# Patient Record
Sex: FEMALE | Race: WHITE | Employment: FULL TIME | URBAN - METROPOLITAN AREA
[De-identification: names, ages, dates, MRNs, and addresses within clinical notes are randomized per-mention and may not be internally consistent; named-entity substitution may affect disease eponyms.]

---

## 2019-03-15 ENCOUNTER — HOSPITAL ENCOUNTER (OUTPATIENT)
Dept: MAMMOGRAPHY | Age: 59
Discharge: HOME | End: 2019-03-17
Payer: COMMERCIAL

## 2019-03-15 DIAGNOSIS — Z12.31 VISIT FOR SCREENING MAMMOGRAM: ICD-10-CM

## 2019-03-15 PROCEDURE — 77063 BREAST TOMOSYNTHESIS BI: CPT

## 2019-03-29 ENCOUNTER — TELEPHONE (OUTPATIENT)
Dept: ONCOLOGY | Age: 59
End: 2019-03-29

## 2020-06-12 ENCOUNTER — HOSPITAL ENCOUNTER (OUTPATIENT)
Dept: MAMMOGRAPHY | Age: 60
Discharge: HOME | End: 2020-06-14
Payer: COMMERCIAL

## 2020-06-12 PROCEDURE — 77063 BREAST TOMOSYNTHESIS BI: CPT

## 2024-01-11 ENCOUNTER — APPOINTMENT (OUTPATIENT)
Dept: OBSTETRICS AND GYNECOLOGY | Facility: CLINIC | Age: 64
End: 2024-01-11
Payer: COMMERCIAL

## 2024-01-12 ENCOUNTER — OFFICE VISIT (OUTPATIENT)
Dept: OBSTETRICS AND GYNECOLOGY | Facility: CLINIC | Age: 64
End: 2024-01-12
Payer: COMMERCIAL

## 2024-01-12 VITALS
DIASTOLIC BLOOD PRESSURE: 80 MMHG | SYSTOLIC BLOOD PRESSURE: 130 MMHG | HEIGHT: 62 IN | BODY MASS INDEX: 30.91 KG/M2 | WEIGHT: 168 LBS

## 2024-01-12 DIAGNOSIS — N84.1 POLYP OF CERVIX: ICD-10-CM

## 2024-01-12 DIAGNOSIS — Z01.419 WELL WOMAN EXAM: Primary | ICD-10-CM

## 2024-01-12 DIAGNOSIS — Z12.31 ENCOUNTER FOR SCREENING MAMMOGRAM FOR BREAST CANCER: ICD-10-CM

## 2024-01-12 PROBLEM — R10.2 PELVIC PRESSURE IN FEMALE: Status: ACTIVE | Noted: 2024-01-12

## 2024-01-12 PROBLEM — M79.669 CALF PAIN: Status: ACTIVE | Noted: 2024-01-12

## 2024-01-12 PROBLEM — R00.2 HEART PALPITATIONS: Status: ACTIVE | Noted: 2024-01-12

## 2024-01-12 PROBLEM — E55.9 VITAMIN D DEFICIENCY: Status: ACTIVE | Noted: 2024-01-12

## 2024-01-12 PROBLEM — M85.80 OSTEOPENIA: Status: ACTIVE | Noted: 2024-01-12

## 2024-01-12 PROBLEM — N83.8 OVARIAN MASS: Status: ACTIVE | Noted: 2024-01-12

## 2024-01-12 PROBLEM — E03.9 HYPOTHYROIDISM: Status: ACTIVE | Noted: 2024-01-12

## 2024-01-12 PROBLEM — L03.90 CELLULITIS: Status: ACTIVE | Noted: 2024-01-12
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
## 2024-01-12 PROBLEM — M25.571 RIGHT ANKLE PAIN: Status: ACTIVE | Noted: 2024-01-12

## 2024-01-12 PROBLEM — L25.9 CONTACT DERMATITIS: Status: ACTIVE | Noted: 2024-01-12

## 2024-01-12 PROBLEM — N28.1 KIDNEY CYSTS: Status: ACTIVE | Noted: 2024-01-12

## 2024-01-12 PROBLEM — J01.90 ACUTE SINUSITIS: Status: ACTIVE | Noted: 2024-01-12

## 2024-01-12 PROBLEM — M15.9 GENERALIZED OSTEOARTHRITIS OF MULTIPLE SITES: Status: ACTIVE | Noted: 2024-01-12

## 2024-01-12 PROCEDURE — 88175 CYTOPATH C/V AUTO FLUID REDO: CPT

## 2024-01-12 PROCEDURE — 99396 PREV VISIT EST AGE 40-64: CPT | Performed by: OBSTETRICS & GYNECOLOGY

## 2024-01-12 RX ORDER — LEVOTHYROXINE SODIUM 112 UG/1
TABLET ORAL
COMMUNITY
Start: 2015-05-22 | End: 2024-03-25 | Stop reason: SDUPTHER

## 2024-01-12 RX ORDER — VIT C/E/ZN/COPPR/LUTEIN/ZEAXAN 250MG-90MG
CAPSULE ORAL
COMMUNITY

## 2024-01-12 ASSESSMENT — PAIN SCALES - GENERAL: PAINLEVEL: 0-NO PAIN

## 2024-01-12 NOTE — PROGRESS NOTES
Subjective   Patient ID: Whitley Zmaora is a 63 y.o. female who presents for Well Women Visit (PT is here for Annual Pap and Mammogram order.  /HPV done 1/5/2023 Neg-).  HPI  No voiced complaints  Review of Systems  10 symptoms have been reviewed and are negative and noncontributory to the patient current ailments.      Objective   Physical Exam  Vital signs reviewed    CONSTITUTIONAL- well nourished, well developed, looks like stated age.  She is sitting comfortably on the exam table in no apparent distress  SKIN- normal skin color and pigmentation no visible lesions  EYES- normal external exam  THYROID- symmetrical ,normal size and normal consistency  HEART- RRR without murmur, S3 or S4.  LUNGS- breathing comfortably, no dyspnea  EXTREMITIES- no deformities  NEUROLOGICAL- oriented and no focal signs.  Cranial nerves II through XII intact  PSYCHIATRIC- alert, pleasant and cordial, age-appropriate, not anxious, or depressed appearing  BREASTS-normal appearance, no skin changes or nipple discharge.  Palpation of the breast and axillae: No palpable mass and no axillary lymphadenopathy  ABDOMEN- soft, non distended, bowel sound normal pitch and intensity,no palpable abnormal masses  GENITOURINARY- External genitalia: Normal.                                 - Uterus: AV/AF NSSC, mobile and nontender                                -The adnexal areas are free of tenderness or mass                                -There noted a solitary cervical polyp.                              -Vagina without lesions, mucosa pink and well-hydrated, discharge is physiologic.                                   Inspection of Perianal Area: Normal    Assessment/Plan   1.  Well woman exam  2.  Encounter for screening mammogram for breast cancer  3.  Cervical polyp-asymptomatic  -We discussed indications for removal of cervical polyp.  Patient requests removal we will schedule at her convenience           Gustabo Charles DO 01/12/24 10:10  AM

## 2024-01-18 ENCOUNTER — ANCILLARY PROCEDURE (OUTPATIENT)
Dept: RADIOLOGY | Facility: CLINIC | Age: 64
End: 2024-01-18
Payer: COMMERCIAL

## 2024-01-18 VITALS — BODY MASS INDEX: 30.91 KG/M2 | WEIGHT: 167.99 LBS | HEIGHT: 62 IN

## 2024-01-18 DIAGNOSIS — Z12.31 ENCOUNTER FOR SCREENING MAMMOGRAM FOR BREAST CANCER: ICD-10-CM

## 2024-01-18 DIAGNOSIS — Z01.419 WELL WOMAN EXAM: ICD-10-CM

## 2024-01-18 PROCEDURE — 77063 BREAST TOMOSYNTHESIS BI: CPT | Performed by: STUDENT IN AN ORGANIZED HEALTH CARE EDUCATION/TRAINING PROGRAM

## 2024-01-18 PROCEDURE — 77067 SCR MAMMO BI INCL CAD: CPT | Performed by: STUDENT IN AN ORGANIZED HEALTH CARE EDUCATION/TRAINING PROGRAM

## 2024-01-18 PROCEDURE — 77067 SCR MAMMO BI INCL CAD: CPT

## 2024-01-23 PROBLEM — H81.10 BENIGN PAROXYSMAL POSITIONAL VERTIGO: Status: ACTIVE | Noted: 2024-01-23

## 2024-01-23 PROBLEM — J30.9 ALLERGIC RHINITIS: Status: RESOLVED | Noted: 2024-01-23 | Resolved: 2024-01-23

## 2024-01-23 PROBLEM — M54.50 LOW BACK PAIN: Status: RESOLVED | Noted: 2024-01-23 | Resolved: 2024-01-23

## 2024-01-23 PROBLEM — M25.569 KNEE PAIN: Status: ACTIVE | Noted: 2024-01-23

## 2024-01-24 ENCOUNTER — OFFICE VISIT (OUTPATIENT)
Dept: OBSTETRICS AND GYNECOLOGY | Facility: CLINIC | Age: 64
End: 2024-01-24
Payer: COMMERCIAL

## 2024-01-24 VITALS
SYSTOLIC BLOOD PRESSURE: 132 MMHG | DIASTOLIC BLOOD PRESSURE: 86 MMHG | WEIGHT: 171 LBS | BODY MASS INDEX: 31.47 KG/M2 | HEIGHT: 62 IN

## 2024-01-24 DIAGNOSIS — N84.1 CERVICAL POLYP: ICD-10-CM

## 2024-01-24 PROCEDURE — 88305 TISSUE EXAM BY PATHOLOGIST: CPT

## 2024-01-24 PROCEDURE — 88305 TISSUE EXAM BY PATHOLOGIST: CPT | Performed by: PATHOLOGY

## 2024-01-24 PROCEDURE — 57500 BIOPSY OF CERVIX: CPT | Performed by: OBSTETRICS & GYNECOLOGY

## 2024-01-24 RX ORDER — SODIUM, POTASSIUM,MAG SULFATES 17.5-3.13G
SOLUTION, RECONSTITUTED, ORAL ORAL
COMMUNITY
Start: 2020-02-05

## 2024-01-24 NOTE — PROGRESS NOTES
Patient ID: Whitley Zamora is a 63 y.o. female.    Procedures  -Patient presents for removal of a asymptomatic cervical polyp.  All risks and complications were discussed verbal was obtained.    The cervix was then prepped with Betadine the polyp was then infiltrated with 1% lidocaine.  The polyp was then removed by sharp dissection.  Hemostasis was obtained with the application of Monsel solution.  Blood loss less than 50 cc no complications were encountered.    Patient left the office in clinically sound condition

## 2024-01-26 LAB
CYTOLOGY CMNT CVX/VAG CYTO-IMP: NORMAL
LAB AP HPV GENOTYPE QUESTION: YES
LAB AP HPV HR: NORMAL
LABORATORY COMMENT REPORT: NORMAL
PATH REPORT.TOTAL CANCER: NORMAL

## 2024-01-29 LAB
LABORATORY COMMENT REPORT: NORMAL
PATH REPORT.FINAL DX SPEC: NORMAL
PATH REPORT.GROSS SPEC: NORMAL
PATH REPORT.RELEVANT HX SPEC: NORMAL
PATH REPORT.TOTAL CANCER: NORMAL

## 2024-02-13 RX ORDER — LEVOTHYROXINE SODIUM 100 UG/1
100 TABLET ORAL DAILY
Qty: 90 TABLET | Refills: 3 | OUTPATIENT
Start: 2024-02-13

## 2024-03-06 DIAGNOSIS — Z01.419 WELL WOMAN EXAM: ICD-10-CM

## 2024-03-06 DIAGNOSIS — E55.9 VITAMIN D DEFICIENCY: ICD-10-CM

## 2024-03-06 DIAGNOSIS — E03.9 HYPOTHYROIDISM, UNSPECIFIED TYPE: ICD-10-CM

## 2024-03-13 ENCOUNTER — LAB (OUTPATIENT)
Dept: LAB | Facility: LAB | Age: 64
End: 2024-03-13
Payer: COMMERCIAL

## 2024-03-13 DIAGNOSIS — Z01.419 WELL WOMAN EXAM: ICD-10-CM

## 2024-03-13 DIAGNOSIS — E03.9 HYPOTHYROIDISM, UNSPECIFIED TYPE: ICD-10-CM

## 2024-03-13 DIAGNOSIS — E55.9 VITAMIN D DEFICIENCY: ICD-10-CM

## 2024-03-13 LAB
25(OH)D3 SERPL-MCNC: 45 NG/ML (ref 30–100)
ALBUMIN SERPL BCP-MCNC: 3.9 G/DL (ref 3.4–5)
ALP SERPL-CCNC: 41 U/L (ref 33–136)
ALT SERPL W P-5'-P-CCNC: 10 U/L (ref 7–45)
ANION GAP SERPL CALC-SCNC: 12 MMOL/L (ref 10–20)
AST SERPL W P-5'-P-CCNC: 15 U/L (ref 9–39)
BASOPHILS # BLD AUTO: 0.04 X10*3/UL (ref 0–0.1)
BASOPHILS NFR BLD AUTO: 0.7 %
BILIRUB SERPL-MCNC: 0.4 MG/DL (ref 0–1.2)
BUN SERPL-MCNC: 11 MG/DL (ref 6–23)
CALCIUM SERPL-MCNC: 8.8 MG/DL (ref 8.6–10.6)
CHLORIDE SERPL-SCNC: 105 MMOL/L (ref 98–107)
CHOLEST SERPL-MCNC: 176 MG/DL (ref 0–199)
CHOLESTEROL/HDL RATIO: 3
CO2 SERPL-SCNC: 28 MMOL/L (ref 21–32)
CREAT SERPL-MCNC: 0.73 MG/DL (ref 0.5–1.05)
EGFRCR SERPLBLD CKD-EPI 2021: >90 ML/MIN/1.73M*2
EOSINOPHIL # BLD AUTO: 0.17 X10*3/UL (ref 0–0.7)
EOSINOPHIL NFR BLD AUTO: 3.1 %
ERYTHROCYTE [DISTWIDTH] IN BLOOD BY AUTOMATED COUNT: 13.6 % (ref 11.5–14.5)
GLUCOSE SERPL-MCNC: 76 MG/DL (ref 74–99)
HCT VFR BLD AUTO: 35.5 % (ref 36–46)
HDLC SERPL-MCNC: 59.4 MG/DL
HGB BLD-MCNC: 11.8 G/DL (ref 12–16)
IMM GRANULOCYTES # BLD AUTO: 0.01 X10*3/UL (ref 0–0.7)
IMM GRANULOCYTES NFR BLD AUTO: 0.2 % (ref 0–0.9)
LDLC SERPL CALC-MCNC: 103 MG/DL
LYMPHOCYTES # BLD AUTO: 2.08 X10*3/UL (ref 1.2–4.8)
LYMPHOCYTES NFR BLD AUTO: 37.7 %
MCH RBC QN AUTO: 30.7 PG (ref 26–34)
MCHC RBC AUTO-ENTMCNC: 33.2 G/DL (ref 32–36)
MCV RBC AUTO: 92 FL (ref 80–100)
MONOCYTES # BLD AUTO: 0.43 X10*3/UL (ref 0.1–1)
MONOCYTES NFR BLD AUTO: 7.8 %
NEUTROPHILS # BLD AUTO: 2.78 X10*3/UL (ref 1.2–7.7)
NEUTROPHILS NFR BLD AUTO: 50.5 %
NON HDL CHOLESTEROL: 117 MG/DL (ref 0–149)
NRBC BLD-RTO: 0 /100 WBCS (ref 0–0)
PLATELET # BLD AUTO: 267 X10*3/UL (ref 150–450)
POTASSIUM SERPL-SCNC: 4.1 MMOL/L (ref 3.5–5.3)
PROT SERPL-MCNC: 6.2 G/DL (ref 6.4–8.2)
RBC # BLD AUTO: 3.84 X10*6/UL (ref 4–5.2)
SODIUM SERPL-SCNC: 141 MMOL/L (ref 136–145)
T4 FREE SERPL-MCNC: 1.64 NG/DL (ref 0.78–1.48)
TRIGL SERPL-MCNC: 66 MG/DL (ref 0–149)
TSH SERPL-ACNC: 5.32 MIU/L (ref 0.44–3.98)
VLDL: 13 MG/DL (ref 0–40)
WBC # BLD AUTO: 5.5 X10*3/UL (ref 4.4–11.3)

## 2024-03-13 PROCEDURE — 85025 COMPLETE CBC W/AUTO DIFF WBC: CPT

## 2024-03-13 PROCEDURE — 84443 ASSAY THYROID STIM HORMONE: CPT

## 2024-03-13 PROCEDURE — 82306 VITAMIN D 25 HYDROXY: CPT

## 2024-03-13 PROCEDURE — 80053 COMPREHEN METABOLIC PANEL: CPT

## 2024-03-13 PROCEDURE — 36415 COLL VENOUS BLD VENIPUNCTURE: CPT

## 2024-03-13 PROCEDURE — 80061 LIPID PANEL: CPT

## 2024-03-13 PROCEDURE — 84439 ASSAY OF FREE THYROXINE: CPT

## 2024-03-25 ENCOUNTER — OFFICE VISIT (OUTPATIENT)
Dept: PRIMARY CARE | Facility: CLINIC | Age: 64
End: 2024-03-25
Payer: COMMERCIAL

## 2024-03-25 VITALS
OXYGEN SATURATION: 94 % | HEIGHT: 62 IN | BODY MASS INDEX: 30.91 KG/M2 | WEIGHT: 168 LBS | HEART RATE: 72 BPM | DIASTOLIC BLOOD PRESSURE: 82 MMHG | RESPIRATION RATE: 16 BRPM | SYSTOLIC BLOOD PRESSURE: 132 MMHG | TEMPERATURE: 96.8 F

## 2024-03-25 DIAGNOSIS — Z00.00 PHYSICAL EXAM: Primary | ICD-10-CM

## 2024-03-25 DIAGNOSIS — Z78.0 POSTMENOPAUSAL: ICD-10-CM

## 2024-03-25 DIAGNOSIS — E55.9 VITAMIN D DEFICIENCY: ICD-10-CM

## 2024-03-25 DIAGNOSIS — E03.9 HYPOTHYROIDISM, UNSPECIFIED TYPE: ICD-10-CM

## 2024-03-25 DIAGNOSIS — E78.5 MILD HYPERLIPIDEMIA: ICD-10-CM

## 2024-03-25 DIAGNOSIS — Z12.11 SCREENING FOR COLON CANCER: ICD-10-CM

## 2024-03-25 DIAGNOSIS — D50.9 IRON DEFICIENCY ANEMIA, UNSPECIFIED IRON DEFICIENCY ANEMIA TYPE: ICD-10-CM

## 2024-03-25 PROCEDURE — 1036F TOBACCO NON-USER: CPT | Performed by: FAMILY MEDICINE

## 2024-03-25 PROCEDURE — 99214 OFFICE O/P EST MOD 30 MIN: CPT | Performed by: FAMILY MEDICINE

## 2024-03-25 PROCEDURE — 99396 PREV VISIT EST AGE 40-64: CPT | Performed by: FAMILY MEDICINE

## 2024-03-25 RX ORDER — LEVOTHYROXINE SODIUM 112 UG/1
TABLET ORAL
Qty: 30 TABLET | Refills: 1 | Status: SHIPPED | OUTPATIENT
Start: 2024-03-25 | End: 2024-05-10 | Stop reason: SDUPTHER

## 2024-03-25 ASSESSMENT — ANXIETY QUESTIONNAIRES
7. FEELING AFRAID AS IF SOMETHING AWFUL MIGHT HAPPEN: NOT AT ALL
1. FEELING NERVOUS, ANXIOUS, OR ON EDGE: NOT AT ALL
5. BEING SO RESTLESS THAT IT IS HARD TO SIT STILL: NOT AT ALL
4. TROUBLE RELAXING: NOT AT ALL
GAD7 TOTAL SCORE: 0
IF YOU CHECKED OFF ANY PROBLEMS ON THIS QUESTIONNAIRE, HOW DIFFICULT HAVE THESE PROBLEMS MADE IT FOR YOU TO DO YOUR WORK, TAKE CARE OF THINGS AT HOME, OR GET ALONG WITH OTHER PEOPLE: NOT DIFFICULT AT ALL
3. WORRYING TOO MUCH ABOUT DIFFERENT THINGS: NOT AT ALL
6. BECOMING EASILY ANNOYED OR IRRITABLE: NOT AT ALL
2. NOT BEING ABLE TO STOP OR CONTROL WORRYING: NOT AT ALL

## 2024-03-25 ASSESSMENT — PATIENT HEALTH QUESTIONNAIRE - PHQ9
SUM OF ALL RESPONSES TO PHQ9 QUESTIONS 1 AND 2: 0
4. FEELING TIRED OR HAVING LITTLE ENERGY: SEVERAL DAYS
5. POOR APPETITE OR OVEREATING: NOT AT ALL
2. FEELING DOWN, DEPRESSED OR HOPELESS: NOT AT ALL
7. TROUBLE CONCENTRATING ON THINGS, SUCH AS READING THE NEWSPAPER OR WATCHING TELEVISION: NOT AT ALL
9. THOUGHTS THAT YOU WOULD BE BETTER OFF DEAD, OR OF HURTING YOURSELF: NOT AT ALL
8. MOVING OR SPEAKING SO SLOWLY THAT OTHER PEOPLE COULD HAVE NOTICED. OR THE OPPOSITE, BEING SO FIGETY OR RESTLESS THAT YOU HAVE BEEN MOVING AROUND A LOT MORE THAN USUAL: NOT AT ALL
6. FEELING BAD ABOUT YOURSELF - OR THAT YOU ARE A FAILURE OR HAVE LET YOURSELF OR YOUR FAMILY DOWN: NOT AT ALL
1. LITTLE INTEREST OR PLEASURE IN DOING THINGS: NOT AT ALL
3. TROUBLE FALLING OR STAYING ASLEEP OR SLEEPING TOO MUCH: NOT AT ALL
SUM OF ALL RESPONSES TO PHQ QUESTIONS 1-9: 1

## 2024-03-25 ASSESSMENT — PROMIS GLOBAL HEALTH SCALE
RATE_AVERAGE_FATIGUE: MILD
EMOTIONAL_PROBLEMS: RARELY
RATE_MENTAL_HEALTH: VERY GOOD
RATE_AVERAGE_PAIN: 1
CARRYOUT_SOCIAL_ACTIVITIES: VERY GOOD
CARRYOUT_PHYSICAL_ACTIVITIES: COMPLETELY
RATE_QUALITY_OF_LIFE: VERY GOOD
RATE_SOCIAL_SATISFACTION: VERY GOOD
RATE_PHYSICAL_HEALTH: VERY GOOD
RATE_GENERAL_HEALTH: VERY GOOD

## 2024-03-25 ASSESSMENT — PAIN SCALES - GENERAL: PAINLEVEL: 0-NO PAIN

## 2024-03-25 NOTE — PROGRESS NOTES
"Subjective   Patient ID: Whitley Zamora is a 63 y.o. female who presents for cpe.    HPI   The patient presents to the clinic for an annual physical exam. She has past medical history of hypothyroidism, heart palpitations, BPPV, vitamin D deficiency, ovarian mass, kidney cysts, generalized osteoarthritis of multiple sites, and contact dermatitis.    The patient had labs done on 03/13/2024 and requests to have them reviewed. Notably, the patient states that she donated blood on 03/03/2024 and she wonders if this may have affected her results.  She has mild anemia    The patient reports a discrepancy between her current dose of levothyroxine medication and the dose of levothyroxine medication listed on her  MyChart. She states that her  MyChart shows that she is taking levothyroxine 112 mcg daily. However, the patient states that she has been taking levothyroxine 100 mcg for treatment of hypothyroidism (as filled by her local pharmacy). She denies any side-effects to her levothyroxine medication. Her tsh is elevated and needs increased dose from 100 mcg and will have her take 112 mcg and then recheck tsh in 6-8 weeks    The patient states that she does not eat much meat. She does eat legumes and dairy and eggs for protein source often.    She continues to visit her OB-GYN (Dr. Charles) regularly. She is up-to-date on gynecological exam (01/12/2024) and mammogram screening (01/18/2024). She has not had a DEXA bone density screening for several years.     Taking D3 supplement    Her most recent colonoscopy screening was done on 03/04/2020 (3-year recall) due to Ta's.  Will order    She reports family history of arthritis and osteopenia with her mother.  Will get bone density    Review of Systems    Objective   /82   Pulse 72   Temp 36 °C (96.8 °F)   Resp 16   Ht 1.575 m (5' 2\")   Wt 76.2 kg (168 lb)   SpO2 94%   BMI 30.73 kg/m²     Physical Exam  Constitutional:       Appearance: Normal appearance. "   HENT:      Head: Normocephalic.      Right Ear: Tympanic membrane normal.      Left Ear: Tympanic membrane normal.      Mouth/Throat:      Pharynx: Oropharynx is clear.   Eyes:      Extraocular Movements: Extraocular movements intact.      Pupils: Pupils are equal, round, and reactive to light.   Neck:      Vascular: No carotid bruit.      Comments: No thyromegaly  Cardiovascular:      Rate and Rhythm: Normal rate and regular rhythm.      Pulses: Normal pulses.      Heart sounds: Normal heart sounds.   Pulmonary:      Effort: Pulmonary effort is normal.      Breath sounds: Normal breath sounds.   Abdominal:      General: Bowel sounds are normal.      Palpations: Abdomen is soft. There is no mass.      Tenderness: There is no abdominal tenderness.   Musculoskeletal:         General: Normal range of motion.      Cervical back: Normal range of motion.      Right lower leg: No edema.      Left lower leg: No edema.   Skin:     General: Skin is warm and dry.   Neurological:      General: No focal deficit present.      Mental Status: She is alert and oriented to person, place, and time.   Psychiatric:         Mood and Affect: Mood normal.         Behavior: Behavior normal.         Thought Content: Thought content normal.         Judgment: Judgment normal.         Assessment/Plan   Problem List Items Addressed This Visit             ICD-10-CM    Vitamin D deficiency E55.9    Hypothyroidism E03.9    Relevant Medications    levothyroxine (Synthroid, Levoxyl) 112 mcg tablet    Other Relevant Orders    TSH with reflex to Free T4 if abnormal    Mild hyperlipidemia E78.5     Other Visit Diagnoses         Codes    Physical exam    -  Primary Z00.00    Iron deficiency anemia, unspecified iron deficiency anemia type     D50.9    Relevant Orders    CBC and Auto Differential    Screening for colon cancer     Z12.11    Relevant Orders    Colonoscopy Screening; High Risk Patient    Postmenopausal     Z78.0    Relevant Orders    XR  DEXA bone density               The patient's labs (03/13/2024) were reviewed. Cholesterol results were mostly WNL (total cholesterol 176/HDL 59.4/triglycerides 66), but LDL (103) was slightly elevated. She was advised to cut down fats in the diet and start aerobic exercise. Chemistries were WNL with FBS of 76. Kidney function markers and liver enzymes were mostly WNL. Total protein (6.2) was slightly below normal range. Her TSH (5.32) was slightly elevated. Her vitamin D (45) was sufficient. Her blood count was mostly WNL, but demonstrated some mild anemia (RBC 3.84/Hemoglobin 11.8/Hematocrit 35.5). Her blood count results may have been slightly abnormal as she recently donated blood prior to having her labs drawn. Repeat labs (CBC, TSH) in ~6 weeks.    In regards to concerns with elevated TSH and confusion regarding the dose of her levothyroxine prescription, the patient was informed that she should be taking levothyroxine 112 mcg daily as previously directed. She received a refill for her levothyroxine prescription at the correct dosage (112 mcg daily). She will be evaluated  further following her repeat TSH screening in ~6 weeks. She was advised to continue monitoring symptoms for improvement/exacerbation.    A colonoscopy screening and DEXA bone density scan were ordered for the patient. The clinic will contact the patient upon receiving her screening results.    Prospective immunizations were discussed with the patient. She will consider receiving the Shingrix vaccine in the near future.  She will call to have done in the next several months    A comprehensive physical exam was conducted on the patient.     Reviewed correct way for taking thyroid medication    Discussed diet and exercise to help lipids in detail    6 month follow up otherwise    Scribe Attestation  By signing my name below, I, Kandace Amador , Scribe   attest that this documentation has been prepared under the direction and in the presence of  Whitley Person DO.

## 2024-04-02 ENCOUNTER — HOSPITAL ENCOUNTER (OUTPATIENT)
Dept: RADIOLOGY | Facility: CLINIC | Age: 64
Discharge: HOME | End: 2024-04-02
Payer: COMMERCIAL

## 2024-04-02 DIAGNOSIS — Z78.0 POSTMENOPAUSAL: ICD-10-CM

## 2024-04-02 PROCEDURE — 77080 DXA BONE DENSITY AXIAL: CPT

## 2024-04-02 PROCEDURE — 77080 DXA BONE DENSITY AXIAL: CPT | Performed by: RADIOLOGY

## 2024-04-09 ENCOUNTER — TELEPHONE (OUTPATIENT)
Dept: PRIMARY CARE | Facility: CLINIC | Age: 64
End: 2024-04-09
Payer: COMMERCIAL

## 2024-04-09 NOTE — TELEPHONE ENCOUNTER
----- Message from Whitley Person DO sent at 4/7/2024  1:45 PM EDT -----  Report to patient that her bone density study shows that her spine has normal bone density.  Her femur and hip both show osteopenia.  She should make sure she is taking vitamin D3, eating calcium rich diet and calcium supplement.  Also weightbearing exercise is important.  Repeat bone density 2 years

## 2024-04-30 ENCOUNTER — TELEPHONE (OUTPATIENT)
Dept: PRIMARY CARE | Facility: CLINIC | Age: 64
End: 2024-04-30
Payer: COMMERCIAL

## 2024-04-30 NOTE — TELEPHONE ENCOUNTER
Patient insurance company called states some of her blood work the cpt 800.61 was denied and needs to be recoded can you take a look at her 3.13.24 visit and let me know

## 2024-05-08 ENCOUNTER — LAB (OUTPATIENT)
Dept: LAB | Facility: LAB | Age: 64
End: 2024-05-08
Payer: COMMERCIAL

## 2024-05-08 DIAGNOSIS — D50.9 IRON DEFICIENCY ANEMIA, UNSPECIFIED IRON DEFICIENCY ANEMIA TYPE: ICD-10-CM

## 2024-05-08 DIAGNOSIS — E03.9 HYPOTHYROIDISM, UNSPECIFIED TYPE: ICD-10-CM

## 2024-05-08 LAB
BASOPHILS # BLD AUTO: 0.05 X10*3/UL (ref 0–0.1)
BASOPHILS NFR BLD AUTO: 0.9 %
EOSINOPHIL # BLD AUTO: 0.18 X10*3/UL (ref 0–0.7)
EOSINOPHIL NFR BLD AUTO: 3.4 %
ERYTHROCYTE [DISTWIDTH] IN BLOOD BY AUTOMATED COUNT: 12.7 % (ref 11.5–14.5)
HCT VFR BLD AUTO: 37.4 % (ref 36–46)
HGB BLD-MCNC: 12.1 G/DL (ref 12–16)
IMM GRANULOCYTES # BLD AUTO: 0.01 X10*3/UL (ref 0–0.7)
IMM GRANULOCYTES NFR BLD AUTO: 0.2 % (ref 0–0.9)
LYMPHOCYTES # BLD AUTO: 2.16 X10*3/UL (ref 1.2–4.8)
LYMPHOCYTES NFR BLD AUTO: 40.3 %
MCH RBC QN AUTO: 29.4 PG (ref 26–34)
MCHC RBC AUTO-ENTMCNC: 32.4 G/DL (ref 32–36)
MCV RBC AUTO: 91 FL (ref 80–100)
MONOCYTES # BLD AUTO: 0.36 X10*3/UL (ref 0.1–1)
MONOCYTES NFR BLD AUTO: 6.7 %
NEUTROPHILS # BLD AUTO: 2.6 X10*3/UL (ref 1.2–7.7)
NEUTROPHILS NFR BLD AUTO: 48.5 %
NRBC BLD-RTO: 0 /100 WBCS (ref 0–0)
PLATELET # BLD AUTO: 245 X10*3/UL (ref 150–450)
RBC # BLD AUTO: 4.12 X10*6/UL (ref 4–5.2)
TSH SERPL-ACNC: 0.6 MIU/L (ref 0.44–3.98)
WBC # BLD AUTO: 5.4 X10*3/UL (ref 4.4–11.3)

## 2024-05-08 PROCEDURE — 36415 COLL VENOUS BLD VENIPUNCTURE: CPT

## 2024-05-08 PROCEDURE — 85025 COMPLETE CBC W/AUTO DIFF WBC: CPT

## 2024-05-08 PROCEDURE — 84443 ASSAY THYROID STIM HORMONE: CPT

## 2024-05-09 DIAGNOSIS — Z12.11 COLON CANCER SCREENING: ICD-10-CM

## 2024-05-10 DIAGNOSIS — E03.9 HYPOTHYROIDISM, UNSPECIFIED TYPE: ICD-10-CM

## 2024-05-10 RX ORDER — POLYETHYLENE GLYCOL 3350, SODIUM CHLORIDE, SODIUM BICARBONATE, POTASSIUM CHLORIDE 420; 11.2; 5.72; 1.48 G/4L; G/4L; G/4L; G/4L
POWDER, FOR SOLUTION ORAL
Qty: 4000 ML | Refills: 0 | Status: SHIPPED | OUTPATIENT
Start: 2024-05-10

## 2024-05-10 RX ORDER — LEVOTHYROXINE SODIUM 112 UG/1
TABLET ORAL
Qty: 90 TABLET | Refills: 3 | Status: SHIPPED | OUTPATIENT
Start: 2024-05-10

## 2024-06-27 ENCOUNTER — APPOINTMENT (OUTPATIENT)
Dept: GASTROENTEROLOGY | Facility: EXTERNAL LOCATION | Age: 64
End: 2024-06-27
Payer: COMMERCIAL

## 2024-06-27 DIAGNOSIS — Z12.11 SCREENING FOR COLON CANCER: ICD-10-CM

## 2024-06-27 DIAGNOSIS — Z86.010 PERSONAL HISTORY OF COLONIC POLYPS: Primary | ICD-10-CM

## 2024-06-27 DIAGNOSIS — K64.4 RESIDUAL HEMORRHOIDAL SKIN TAGS: ICD-10-CM

## 2024-06-27 PROCEDURE — G0105 COLORECTAL SCRN; HI RISK IND: HCPCS | Performed by: INTERNAL MEDICINE

## 2024-06-27 NOTE — PROGRESS NOTES
This patient had a procedure(s) done at the [facility].  Please see the procedure note for full details.     calm

## 2024-07-10 ENCOUNTER — HOSPITAL ENCOUNTER (OUTPATIENT)
Dept: RADIOLOGY | Facility: CLINIC | Age: 64
Discharge: HOME | End: 2024-07-10
Payer: COMMERCIAL

## 2024-07-10 ENCOUNTER — APPOINTMENT (OUTPATIENT)
Dept: PRIMARY CARE | Facility: CLINIC | Age: 64
End: 2024-07-10
Payer: COMMERCIAL

## 2024-07-10 VITALS
OXYGEN SATURATION: 98 % | HEIGHT: 62 IN | TEMPERATURE: 97.6 F | BODY MASS INDEX: 29.81 KG/M2 | SYSTOLIC BLOOD PRESSURE: 132 MMHG | DIASTOLIC BLOOD PRESSURE: 86 MMHG | HEART RATE: 70 BPM | RESPIRATION RATE: 18 BRPM | WEIGHT: 162 LBS

## 2024-07-10 DIAGNOSIS — R20.2 PARESTHESIA: ICD-10-CM

## 2024-07-10 DIAGNOSIS — M54.42 ACUTE LEFT-SIDED LOW BACK PAIN WITH LEFT-SIDED SCIATICA: ICD-10-CM

## 2024-07-10 DIAGNOSIS — M54.42 ACUTE LEFT-SIDED LOW BACK PAIN WITH LEFT-SIDED SCIATICA: Primary | ICD-10-CM

## 2024-07-10 PROCEDURE — 99213 OFFICE O/P EST LOW 20 MIN: CPT | Performed by: FAMILY MEDICINE

## 2024-07-10 PROCEDURE — 1036F TOBACCO NON-USER: CPT | Performed by: FAMILY MEDICINE

## 2024-07-10 PROCEDURE — 72110 X-RAY EXAM L-2 SPINE 4/>VWS: CPT | Performed by: RADIOLOGY

## 2024-07-10 PROCEDURE — 72110 X-RAY EXAM L-2 SPINE 4/>VWS: CPT

## 2024-07-10 ASSESSMENT — PATIENT HEALTH QUESTIONNAIRE - PHQ9
2. FEELING DOWN, DEPRESSED OR HOPELESS: NOT AT ALL
SUM OF ALL RESPONSES TO PHQ9 QUESTIONS 1 AND 2: 0
1. LITTLE INTEREST OR PLEASURE IN DOING THINGS: NOT AT ALL

## 2024-07-10 ASSESSMENT — PAIN SCALES - GENERAL: PAINLEVEL: 8

## 2024-07-10 NOTE — PROGRESS NOTES
"Subjective   Patient ID: Whitley Zamora is a 63 y.o. female who presents for sciatica pain.    HPI   The patient presents to the clinic with concerns of sciatica pain. She has past medical history of hypothyroidism, heart palpitations, BPPV, vitamin D deficiency, ovarian mass, kidney cysts, generalized osteoarthritis of multiple sites, and contact dermatitis.    The patient has been suffering from fluctuating symptoms of intermittent low back pain for several months. Her pain symptoms tend to exacerbate when sitting (in comparison to when standing). She states that her low back pain symptoms radiate  down her left leg to her ankle, this is getting elvia better this week. She also notes paresthesia in her left leg occasionally. She states that her symptoms have slightly improved recently. She has been performing at-home exercises to alleviate her pain symptoms that she did at PT in the past. She denies any prior injury to her back.  She states when helping her mother move she felt pull in her back and s/s started then went away for about 10 days and then back again and not improving some.  Her most recent x-ray of the lumbar spine was done in December 2014. At the time, her imaging results showed mild discogenic degenerative changes mainly at L5-S1. She has not had any imaging done on her spine since this previous x-ray.    She is taking advil, when pain was at its worst she took no more than 2-3 per day.  She has not had any today or yesterday    Her most recent colonoscopy screening was done in June 2024.    Review of Systems    Objective   /86   Pulse 70   Temp 36.4 °C (97.6 °F)   Resp 18   Ht 1.575 m (5' 2\")   Wt 73.5 kg (162 lb)   SpO2 98%   BMI 29.63 kg/m²     Physical Exam  Constitutional:       Appearance: Normal appearance.   Cardiovascular:      Rate and Rhythm: Normal rate and regular rhythm.      Pulses: Normal pulses.      Heart sounds: Normal heart sounds.   Pulmonary:      Effort: " Pulmonary effort is normal.      Breath sounds: Normal breath sounds.   Abdominal:      General: Bowel sounds are normal.      Palpations: Abdomen is soft. There is no mass.      Tenderness: There is no abdominal tenderness.   Musculoskeletal:         General: Normal range of motion.      Cervical back: Normal range of motion.      Right lower leg: No edema.      Left lower leg: No edema.      Comments: Pt has full ROM of ls SPINE IN FLEXION.  DTR's are symmetrical lower extremities + 1/4.    No sensory or vasc deficit and great toe strength wnl and straight leg raising wnl   Skin:     General: Skin is warm and dry.   Neurological:      General: No focal deficit present.      Mental Status: She is alert and oriented to person, place, and time.      Sensory: No sensory deficit.      Motor: No weakness.      Gait: Gait normal.   Psychiatric:         Mood and Affect: Mood normal.         Thought Content: Thought content normal.         Judgment: Judgment normal.         Assessment/Plan   Problem List Items Addressed This Visit    None  Visit Diagnoses         Codes    Acute left-sided low back pain with left-sided sciatica    -  Primary M54.42    Relevant Orders    XR lumbar spine complete 4+ views    Paresthesia     R20.2               In regards to concerns with fluctuating symptoms of intermittent low back pain, an x-ray of the lumbar spine was ordered for the patient for further evaluation of this condition. In addition, she will continue performing at-home exercises for further relief of pain symptoms. She may consider starting physical therapy sessions in the future. She will call if needs to start.  Discussed advil, she is taking low dose prn with food and told ok to continue as needed.  For now, continue monitoring symptoms for improvement/exacerbation.    Scribe Attestation  By signing my name below, I, Stuart Ren   attest that this documentation has been prepared under the direction and in the  presence of Whitley Person DO.

## 2024-07-17 ENCOUNTER — TELEPHONE (OUTPATIENT)
Dept: PRIMARY CARE | Facility: CLINIC | Age: 64
End: 2024-07-17
Payer: COMMERCIAL

## 2024-07-30 DIAGNOSIS — M15.9 GENERALIZED OSTEOARTHRITIS OF MULTIPLE SITES: Primary | ICD-10-CM

## 2024-07-30 RX ORDER — DICLOFENAC SODIUM 10 MG/G
4 GEL TOPICAL 4 TIMES DAILY
COMMUNITY
End: 2024-07-30 | Stop reason: SDUPTHER

## 2024-07-31 DIAGNOSIS — M54.42 ACUTE LEFT-SIDED LOW BACK PAIN WITH LEFT-SIDED SCIATICA: Primary | ICD-10-CM

## 2024-07-31 RX ORDER — DICLOFENAC SODIUM 10 MG/G
4 GEL TOPICAL 4 TIMES DAILY
Qty: 480 G | Refills: 0 | Status: SHIPPED | OUTPATIENT
Start: 2024-07-31 | End: 2024-08-30

## 2024-09-03 ENCOUNTER — EVALUATION (OUTPATIENT)
Dept: PHYSICAL THERAPY | Facility: CLINIC | Age: 64
End: 2024-09-03
Payer: COMMERCIAL

## 2024-09-03 DIAGNOSIS — M54.42 ACUTE LEFT-SIDED LOW BACK PAIN WITH LEFT-SIDED SCIATICA: ICD-10-CM

## 2024-09-03 PROCEDURE — 97110 THERAPEUTIC EXERCISES: CPT | Mod: GP

## 2024-09-03 PROCEDURE — 97161 PT EVAL LOW COMPLEX 20 MIN: CPT | Mod: GP

## 2024-09-03 ASSESSMENT — ENCOUNTER SYMPTOMS
LOSS OF SENSATION IN FEET: 0
OCCASIONAL FEELINGS OF UNSTEADINESS: 0
DEPRESSION: 0

## 2024-09-03 NOTE — PROGRESS NOTES
Physical Therapy Evaluation    Patient Name: Whitley Zamora  MRN: [id number]  Today's Date: 09/03/24        Insurance:  [place]: 1 of [place]  Insurance Type: Payor: RAFA / Plan: ANTHEM HMP / Product Type: *No Product type* /   Authorization or Plan of Care date Range: [place]     Therapy diagnoses:   1. Acute left-sided low back pain with left-sided sciatica  Referral to Physical Therapy             Precautions:  BPPV, left shoulder injury, right knee injury   Fall Risk: None    SUBJECTIVE:  This was a lifting injury helping her mother in law move. Lifting boxes repeatedly. Has had sciatica in the past but this was the worst case she has had. Took a long time to get better.   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   Has used a standing desk and that helped. Thru out August the pain and symptoms got better.   Numbness and tingling was the worst. Went down the left side to the ankle. Now does not have those symptoms. Is feeling better.     Pain:  0/10 at rest   Worst with sitting     Home Living:  INDP   Works from home - computer work     Prior level of function:  INDP     OBJECTIVE:    Lumbar AROM: (% movement)   Flexion WFL   Extension WFL   Right Sidebend WFL   Left Sidebend WFL   Right Rotation WFL   Left Rotation WFL       HIP AROM WFL laly   HIP MMT  Flexors 4/5, abd 4/5, add 4+/5, ext 4-/5, IR ER 4-/5 LALY   T AROM WFL   Hamstring 4+/5 laly, quad 4+/5 laly   DF PF 4/5 laly     Core Strength: fair -  Palpation: left iliac crest to piriformis area painful   Special Testing: SLR positive left  Dermatomal Impairment: L4-5    Flexibility piriformis mild deficit laly, hamstring WFL - stretch for neural glides, hip flex mild deficit laly     Oswestry 14/50 - lifting, sitting, pain most limited     ASSESSMENT:  Pt with skilled need for PT to address core strength, pain, and soft tissue tightness in the low back. Pt with need for guided strength program to reduce symptoms and improve her functional mobility. Pt  displays weakness in the hip extensors, core, and control. Pt educated in centralized symptoms, frequency of exercise, and stretches. HEP provided.   Pt presents with the following deficits/problems that indicate a skilled need for PT:   Pain, strength, functional mobility, centralized symptoms, DLS  Level of Complexity: low    TREATMENT:  Manual PROM stretches lumbar spine and gulshan hips   STM left iliac crest to posterior hip musculature 4 min.   Exercise x 10 per below    PATIENT EDUCATION:  HEP  goals  safety  POC  interventions selected    Access Code: [email or web address]  URL: [email or web address]  Date: 09/03/2024  Prepared by: Birdie Gutierrez    Exercises  - Supine Posterior Pelvic Tilt  - 1 x daily - 7 x weekly - 2 sets - 10 reps  - Supine Bridge with Resistance Band  - 1 x daily - 7 x weekly - 2 sets - 10 reps  - Hooklying Isometric Clamshell  - 1 x daily - 7 x weekly - 2 sets - 10 reps    PLAN:   Pt to be seen 1-2 times per week for 6-8 weeks.     Pt POC to include:  Therapeutic EX, Therapeutic ACT, NMR, Self care, Manual therapy, Gait training, CP/MHP, Education      Rehab potential:  Good   Plan of care agreement  Patient   GOALS:  Active       PT Problem       PT Goal 1       Start:  09/03/24    Expected End:  11/02/24       1) Pain will be reduced to 0/10 at rest no more than 2/10 with activity.   2) Function will be increased to be able to complete daily walking 10 min symptom free and increased activity in the home unrestricted by pain.   3) ROM will be increased to be WNL at pain free gulshan LE   4) Strength to be increased to be WNL at 4+/5 gulshan LE, core good   5) Independent in Home Exercise Program  6) Independent return to exercise program 4 x week focused on DLS and walking   7) Outcome tool improvement to 10/50 or less oswestry   8) LE flexibility WFL gulshan gulshan   9) DLS clementina with balance activity gulshan                Patient Stated Goal 1       Start:  09/03/24    Expected End:  11/02/24        Preventative care

## 2024-09-17 ENCOUNTER — APPOINTMENT (OUTPATIENT)
Dept: PHYSICAL THERAPY | Facility: CLINIC | Age: 64
End: 2024-09-17
Payer: COMMERCIAL

## 2024-09-25 ENCOUNTER — TREATMENT (OUTPATIENT)
Dept: PHYSICAL THERAPY | Facility: CLINIC | Age: 64
End: 2024-09-25
Payer: COMMERCIAL

## 2024-09-25 DIAGNOSIS — M54.42 ACUTE LEFT-SIDED LOW BACK PAIN WITH LEFT-SIDED SCIATICA: Primary | ICD-10-CM

## 2024-09-25 PROCEDURE — 97110 THERAPEUTIC EXERCISES: CPT | Mod: GP

## 2024-09-25 NOTE — PROGRESS NOTES
Physical Therapy Treatment    Patient Name: Whitley Zamora  MRN: [id number]    Today's Date: 2024    Insurance:  Visit [place]: 2 of [place]  Insurance Type: Payor: ANTHEM / Plan: ANTHEM HMP / Product Type: *No Product type* /   Authorization or Plan of Care date [place]: [place]      Diagnosis:   1. Acute left-sided low back pain with left-sided sciatica            Precautions:  BPPV, left shoulder injury, right knee injury   Fall Risk: None    SUBJECTIVE:  Was out of town for a . Has not been able to focus on her exercise with the  and travel.   Pain level: 0, denies pain   Compliant with HEP? Partially     OBJECTIVE:  Left hip musculature soft tissue restriction       TREATMENT:  - Therex:  SKTC x 5 gulshan   DKTC x 8  LTR    Piriformis stretch figure 4 with ball     Hip fall out  PT   Mini bridge   TB green abduction hook lying   S/l clamshell     Stair stretches - hip flex, hamstring, gastroc 3 x 20 sec gulshan     - Manual Therapy:  Left lumbar STM - TBR   Gaps glides left s/l   PA glides - gently       - Modalities:    Declined     ASSESSMENT:   Pt clementina well with no reports of pain in low back thru out session. Pt able to complete exercise well with cues for technique. Pt did have right knee discomfort even in supine. Pt with soft tissue restriction in the left low back addressed with STM. Pt responded well to PT. HEP advanced.     PLAN:    Progress DLS as able to clementina toward goals.     Access Code: [email or web address]  URL: [email or web address]  Date: 2024  Prepared by: Birdie Gutierrez    Exercises  - Standing Knee Flexion Stretch on Step  - 1 x daily - 7 x weekly - 2 sets - 3 reps - 20 hold  - Standing Hamstring Stretch with Step  - 1 x daily - 7 x weekly - 2 sets - 3 reps - 20 hold  - Clamshell  - 1 x daily - 7 x weekly - 2 sets - 10 reps  - Hooklying Clamshell with Resistance  - 1 x daily - 7 x weekly - 2 sets - 10 reps  - Supine Posterior Pelvic Tilt  - 1 x daily - 7 x weekly - 2 sets -  10 reps  - Bent Knee Fallouts  - 1 x daily - 7 x weekly - 2 sets - 10 reps  - Supine Bridge  - 1 x daily - 7 x weekly - 2 sets - 10 reps    Billing:     PT Therapeutic Procedures Time Entry  Manual Therapy Time Entry: 5  Therapeutic Exercise Time Entry: 35

## 2024-10-08 ENCOUNTER — TREATMENT (OUTPATIENT)
Dept: PHYSICAL THERAPY | Facility: CLINIC | Age: 64
End: 2024-10-08
Payer: COMMERCIAL

## 2024-10-08 DIAGNOSIS — M54.42 ACUTE LEFT-SIDED LOW BACK PAIN WITH LEFT-SIDED SCIATICA: Primary | ICD-10-CM

## 2024-10-08 PROCEDURE — 97110 THERAPEUTIC EXERCISES: CPT | Mod: GP,CQ | Performed by: PHYSICAL THERAPY ASSISTANT

## 2024-10-08 NOTE — PROGRESS NOTES
"  Physical Therapy Treatment    Patient Name: Whitley Zamora  MRN: [id number]    Today's Date: 10/8/2024    Insurance:  Visit [place]: 3 of [place]  Insurance Type: Payor: ANTHEM / Plan: ANTHEM HMP / Product Type: *No Product type* /   Authorization or Plan of Care date [place]: [place]      Diagnosis:   1. Acute left-sided low back pain with left-sided sciatica  Follow Up In Physical Therapy            Precautions:  BPPV, left shoulder injury, right knee injury   Fall Risk: None    SUBJECTIVE:  Pt reports no pain entering therapy this morning. She says that she continues to deal with intermittent radicular symptoms down to her L ankle.   Pain level: 0-2/10 denies pain   Compliant with HEP? Partially     OBJECTIVE:  Left hip musculature soft tissue restriction       TREATMENT:  - Therex:  Bike 5'  Stair stretches - hip flex, hamstring, gastroc 3 x 20 sec gulshan     SKTC x 5 gulshan   DKTC x 5  LTR    Piriformis stretch figure 4 with ball     H/L bridges 5\"x15    H/L hip abd GTB 2x10  PT   TB green abduction hook lying 2x10  S/l clamshell GTB x15  PPU x15    Prone planks, knees,elbows 5\"x10    - Manual Therapy:  NP    - Modalities:    Declined     ASSESSMENT:   Responded well to session. Pain remained negligible with exercises. Encouraged consistency with HEP to maximize benefits of PT.     PLAN:    Progress DLS as able to clementina toward goals. (Cont)      Billing:     PT Therapeutic Procedures Time Entry  Therapeutic Exercise Time Entry: 40                    "

## 2024-10-31 ENCOUNTER — TREATMENT (OUTPATIENT)
Dept: PHYSICAL THERAPY | Facility: CLINIC | Age: 64
End: 2024-10-31
Payer: COMMERCIAL

## 2024-10-31 DIAGNOSIS — M54.42 ACUTE LEFT-SIDED LOW BACK PAIN WITH LEFT-SIDED SCIATICA: ICD-10-CM

## 2024-10-31 PROCEDURE — 97110 THERAPEUTIC EXERCISES: CPT | Mod: GP

## 2024-11-14 ENCOUNTER — TREATMENT (OUTPATIENT)
Dept: PHYSICAL THERAPY | Facility: CLINIC | Age: 64
End: 2024-11-14
Payer: COMMERCIAL

## 2024-11-14 DIAGNOSIS — M54.42 ACUTE LEFT-SIDED LOW BACK PAIN WITH LEFT-SIDED SCIATICA: Primary | ICD-10-CM

## 2024-11-14 PROCEDURE — 97110 THERAPEUTIC EXERCISES: CPT | Mod: GP

## 2024-11-14 NOTE — PROGRESS NOTES
Physical Therapy Treatment    Patient Name: Whitley Zamora  MRN: [id number]    Today's Date: 11/14/2024    Insurance:  Visit [place]: 5 of [place]  Insurance Type: Payor: RAFA / Plan: ANTHEM HMP / Product Type: *No Product type* /   Authorization or Plan of Care date [place]: [place]      Diagnosis:   1. Acute left-sided low back pain with left-sided sciatica              Precautions:  BPPV, left shoulder injury, right knee injury   Fall Risk: None    SUBJECTIVE:  Does not have much pain. Back is feeling pretty good.   For some reason, her back is sore when she lays on her couch.     Pain level: 0-2/10 denies pain   Compliant with HEP? Partially     OBJECTIVE:  Minimal UE assist with dynamic ex       TREATMENT:  - Therex:    Stair stretches - hip flex, hamstring, gastroc 3 x 20 sec gulshan     SKTC x 5 gulshan   DKTC x 5  LTR  Piriformis stretch figure 4 with ball   LTR stretch supine 4 x 15 sec gulshan       PT x 20  Reverse clam x 15  S/l clamshell GTB x15  S/l abduction gulshan x 12   SLR mini range x 10 gulshan   Bridges x 20       Air ex x 15:  CR  Hip abduction  Hip extension  Alt UE LE march   Hip hike     - Manual Therapy:  NP    - Modalities:    CP x 10  min supine     ASSESSMENT:  Pt clementina strength progression for DLS and hips well. Pt challenged and cued for technique. Pt able to do all air ex activity with minor UE support. Pt has been able to clementina with only muscle fatigue.     PLAN:   To have 3 weeks to do updated HEP and then return for progression or discharge       Billing:     PT Therapeutic Procedures Time Entry  Therapeutic Exercise Time Entry: 45      Access Code: [email or web address]  URL: [email or web address]  Date: 11/14/2024  Prepared by: Birdie Gutierrez    Exercises  - Sidelying Hip Abduction  - 1 x daily - 7 x weekly - 2 sets - 10 reps  - Sidelying Reverse Clamshell  - 1 x daily - 7 x weekly - 2 sets - 10 reps  - Small Range Straight Leg Raise  - 1 x daily - 7 x weekly - 2 sets - 10 reps  - Standing Hip  Hiking  - 1 x daily - 7 x weekly - 2 sets - 10 reps  - Standing Hip Extension  - 1 x daily - 7 x weekly - 3 sets - 10 reps  - Standing Hip Abduction  - 1 x daily - 7 x weekly - 3 sets - 10 reps  - Standing Romberg to 3/4 Tandem Stance  - 1 x daily - 7 x weekly - 3 sets - 10 reps

## 2024-12-02 ENCOUNTER — APPOINTMENT (OUTPATIENT)
Dept: PHYSICAL THERAPY | Facility: CLINIC | Age: 64
End: 2024-12-02
Payer: COMMERCIAL

## 2024-12-02 ENCOUNTER — DOCUMENTATION (OUTPATIENT)
Dept: PHYSICAL THERAPY | Facility: CLINIC | Age: 64
End: 2024-12-02
Payer: COMMERCIAL

## 2024-12-02 NOTE — PROGRESS NOTES
Physical Therapy                 Therapy Communication Note    Patient Name: Whitley Zamora  MRN: [id number]  Department:   Room: Room/bed info not found  Today's Date: 12/2/2024     Discipline: Physical Therapy    Missed Visit Reason:      Missed Time: Cancel    Comment:

## 2024-12-12 ENCOUNTER — TREATMENT (OUTPATIENT)
Dept: PHYSICAL THERAPY | Facility: CLINIC | Age: 64
End: 2024-12-12
Payer: COMMERCIAL

## 2024-12-12 DIAGNOSIS — M54.42 ACUTE LEFT-SIDED LOW BACK PAIN WITH LEFT-SIDED SCIATICA: ICD-10-CM

## 2024-12-12 PROCEDURE — 97110 THERAPEUTIC EXERCISES: CPT | Mod: GP

## 2024-12-12 NOTE — PROGRESS NOTES
Physical Therapy Treatment    Patient Name: Whitley Zamora  MRN: [id number]    Today's Date: 12/12/2024    Insurance:  [place]: 6 [place]  Insurance Type: Payor: ANTHEM / Plan: ANTHEM HMP / Product Type: *No Product type* /   Authorization or Plan of Care date Range:  [place]      Diagnosis:   1. Acute left-sided low back pain with left-sided sciatica  Follow Up In Physical Therapy            Precautions:  BPPV, left shoulder injury, right knee injury   Fall Risk: None    SUBJECTIVE:  Feel stronger. Was able to move furniture. HEP is going well.     Pain level: 0-2/10 denies pain   Compliant with HEP = yes     OBJECTIVE:  Minimal UE assist with dynamic ex       TREATMENT:  - Therex:  Nustep level 3 5 min.     Stair stretches - hip flex, hamstring, gastroc 3 x 20 sec gulshan     SKTC x 5 gulshan   DKTC x 5  LTR  Piriformis stretch figure 4 with ball   LTR stretch supine 4 x 15 sec gulshan       PT x 20  Reverse clam x 15  S/l clamshell GTB x15  S/l abduction gulshan x 12   SLR mini range x 10 gulshan   Bridges x 20       Air ex x 15:  CR  Hip abduction  Hip extension  Alt UE LE march   Hip hike     NBOS activity 60 sec x 2 gulshan   Squat activity for LE strength x 12     - Manual Therapy:  NP    - Modalities:    CP x 10  min supine     ASSESSMENT:  Pt with good clementina of current plan for strength. Challenged with balance and air ex dynamic surface. Pt challenged with head motions during NBOS activity. Pt able to progress DLS well, denied pain. Pt with improved clementina of strength activity. Progressing well.     PLAN:   Wants to continue with PT for further strength.       Billing:     PT Therapeutic Procedures Time Entry  Therapeutic Exercise Time Entry: 45

## 2025-01-10 ENCOUNTER — TREATMENT (OUTPATIENT)
Dept: PHYSICAL THERAPY | Facility: CLINIC | Age: 65
End: 2025-01-10
Payer: COMMERCIAL

## 2025-01-10 DIAGNOSIS — M54.42 ACUTE LEFT-SIDED LOW BACK PAIN WITH LEFT-SIDED SCIATICA: Primary | ICD-10-CM

## 2025-01-10 PROCEDURE — 97110 THERAPEUTIC EXERCISES: CPT | Mod: GP,CQ | Performed by: PHYSICAL THERAPY ASSISTANT

## 2025-01-10 NOTE — PROGRESS NOTES
Physical Therapy Treatment    Patient Name: Whitley Zamora  MRN: [id number]    Today's Date: 1/10/2025    Insurance:  Visit [place]: 7 of [place]  Insurance Type: Payor: ANTHEM / Plan: ANTHEM HMP / Product Type: *No Product type* /   Authorization or Plan of Care date Range:  [place]      Diagnosis:   1. Acute left-sided low back pain with left-sided sciatica                Precautions:  BPPV, left shoulder injury, right knee injury   Fall Risk: None    SUBJECTIVE:  Pt enters into therapy reporting minimal to no LBP. She reports that sciatica pain diminished to a great degree and is no longer daily effected.   Pain level: 0-1/10 most days unless trying to lift heavy objects.   Compliant with HEP = yes     OBJECTIVE:  Trunk control with standing exercises.   Can perform SL bridges with high skill level.       TREATMENT:  - Therex:  Nustep level 3 5 min.     Stair stretches - hip flex, hamstring, gastroc 3 x 20 sec gulshan     SKTC x 5 gulshan   DKTC x 5  LTR  Piriformis stretch figure 4 with ball   LTR stretch supine 4 x 15 sec gulshan       PT x 20  Reverse clam x 15  S/l clamshell GTB x20  S/l abduction gulshan x 15  SLR mini range x 10 gulshan   Bridges x 20   SL bridge x10  Sahrmann L4      Air ex x 20:  CR  Hip abduction  Hip extension  Alt UE LE march   Hip hike     NBOS activity 60 sec x 2 gulshan   Squat activity for LE strength x 12 -next visit    - Manual Therapy:  NP    - Modalities:    NP    ASSESSMENT:  Cont to make steady progressions toward goals. Improved exercise performance with session. Radicular have reduced to a manageable level with none present with today's session.   PLAN:   Wants to continue with PT for further strength.       Billing:     PT Therapeutic Procedures Time Entry  Therapeutic Exercise Time Entry: 44

## 2025-01-24 ENCOUNTER — TREATMENT (OUTPATIENT)
Dept: PHYSICAL THERAPY | Facility: CLINIC | Age: 65
End: 2025-01-24
Payer: COMMERCIAL

## 2025-01-24 DIAGNOSIS — M54.42 ACUTE LEFT-SIDED LOW BACK PAIN WITH LEFT-SIDED SCIATICA: Primary | ICD-10-CM

## 2025-01-24 PROCEDURE — 97110 THERAPEUTIC EXERCISES: CPT | Mod: GP

## 2025-01-24 NOTE — PROGRESS NOTES
Physical Therapy Treatment    Patient Name: Whitley Zamora  MRN: [id number]    Today's Date: 1/24/2025    Insurance:  Visit [place]: 8 of [place]  Insurance Type: Payor: ANTHEM / Plan: ANTHEM HMP / Product Type: *No Product type* /   Authorization or Plan of Care date Range:  [place]      Diagnosis:   1. Acute left-sided low back pain with left-sided sciatica                Precautions:  BPPV, left shoulder injury, right knee injury   Fall Risk: None    SUBJECTIVE:  1 episode of low back pain, pointing to the central low back in the last few days. Reports her left leg has felt really good.     Pain level: 0-1/10   Compliant with HEP = yes     OBJECTIVE:        TREATMENT:  - Therex:  Nustep level 3 5 min.     Stair stretches - hip flex, hamstring, gastroc 3 x 20 sec gulshan     SKTC x 5 gulshan   DKTC x 5  LTR  Piriformis stretch figure 4 with ball   LTR stretch supine 4 x 15 sec gulshan       PT x 20  Reverse clam x 15  S/l clamshell GTB x20  S/l abduction gulshan x 15  SLR mini range x 10 gulshan   Bridges x 20   SL bridge x10    Sahrmann L4      Air ex x 20:  CR  Hip abduction  Hip extension  Alt UE LE march   Hip hike     NBOS activity 30 sec x 3 gulshan UE movement air ex       - Manual Therapy:  NP    - Modalities:    NP    ASSESSMENT:  Pt clementina well. HEP updated. Pt able to progress with dynamic core strength via balance. Pt challenged. Pt with no symptoms or pain reported. Pt is doing well with progression of program to achieve goals.     PLAN:   Wants to continue with PT for further strength.     Access Code: [email or web address]  URL: [email or web address]  Date: 01/24/2025  Prepared by: Birdie Gutierrez    Exercises  - Supine Figure 4 Piriformis Stretch  - 1 x daily - 7 x weekly - 1-2 sets - 4 reps - 15 hold  - Sidelying Hip Abduction  - 1 x daily - 7 x weekly - 2 sets - 10 reps  - Supine March with Posterior Pelvic Tilt  - 1 x daily - 7 x weekly - 2 sets - 10 reps  - Supine Pelvic Tilt with Straight Leg Raise  - 1 x daily - 7 x weekly  - 2 sets - 10 reps  - Tandem Stance  - 1 x daily - 7 x weekly - 1 sets - 3 reps - 30-45 hold    Billing:     PT Therapeutic Procedures Time Entry  Therapeutic Exercise Time Entry: 45

## 2025-02-06 ENCOUNTER — TREATMENT (OUTPATIENT)
Dept: PHYSICAL THERAPY | Facility: CLINIC | Age: 65
End: 2025-02-06
Payer: COMMERCIAL

## 2025-02-06 DIAGNOSIS — M54.42 ACUTE LEFT-SIDED LOW BACK PAIN WITH LEFT-SIDED SCIATICA: Primary | ICD-10-CM

## 2025-02-06 PROCEDURE — 97110 THERAPEUTIC EXERCISES: CPT | Mod: GP

## 2025-02-06 NOTE — PROGRESS NOTES
Physical Therapy Treatment    Patient Name: Whitley Zamora  MRN: [id number]    Today's Date: 2/6/2025    Insurance:  Visit [place]: 9 of [place]  Insurance Type: Payor: ANTHEM / Plan: ANTHEM HMP / Product Type: *No Product type* /   Authorization or Plan of Care date [place]:  [place]      Diagnosis:   1. Acute left-sided low back pain with left-sided sciatica                Precautions:  BPPV, left shoulder injury, right knee injury   Fall Risk: None    SUBJECTIVE:       Pain level: 0-1/10   Compliant with HEP = yes     OBJECTIVE:        TREATMENT:  - Therex:  Nustep level 3 5 min.     Stair stretches - hip flex, hamstring, gastroc 3 x 20 sec gulshan     SKTC x 5 gulshan   DKTC x 5  LTR  Piriformis stretch figure 4 with ball   LTR stretch supine 4 x 15 sec gulshan       PT x 20  Reverse clam x 15  S/l clamshell GTB x20  S/l abduction gulshan x 15  SLR mini range x 10 gulshan   Bridges x 20 with Green TB   SL bridge x10  PT with alt marches x 15 gulshan     Air ex x 20:  CR  Hip abduction  Hip extension  Alt UE LE march   Hip hike     NBOS activity 60 sec x 3 gulshan UE movement air ex   Modified SLS 30 sec x 4 gulshan     - Modalities:    Declined     ASSESSMENT:  Pt clementina well with no reports of pain in the low back. Pt with right knee pain with flexion today. Pt able to clementina increased core strength and dynamic DLS. Progressing well.     PLAN:   Increased difficulty of exercise.    Billing:     PT Therapeutic Procedures Time Entry  Therapeutic Exercise Time Entry: 45

## 2025-02-21 ENCOUNTER — TREATMENT (OUTPATIENT)
Dept: PHYSICAL THERAPY | Facility: CLINIC | Age: 65
End: 2025-02-21
Payer: COMMERCIAL

## 2025-02-21 DIAGNOSIS — M54.42 ACUTE LEFT-SIDED LOW BACK PAIN WITH LEFT-SIDED SCIATICA: Primary | ICD-10-CM

## 2025-02-21 PROCEDURE — 97110 THERAPEUTIC EXERCISES: CPT | Mod: GP

## 2025-02-21 NOTE — PROGRESS NOTES
Physical Therapy Treatment    Patient Name: Whitley Zamora  MRN: [id number]    Today's Date: 2/21/2025    Insurance:  Visit [place]: 10 of [place]  Insurance Type: Payor: ANTHEM / Plan: ANTHEM HMP / Product Type: *No Product type* /   Authorization or Plan of Care date [place]:  [place]      Diagnosis:   1. Acute left-sided low back pain with left-sided sciatica                Precautions:  BPPV, left shoulder injury, right knee injury   Fall Risk: None    SUBJECTIVE:   Reports she feels OK.     Pain level: 0-1/10   Compliant with HEP = yes     OBJECTIVE:        TREATMENT:  - Therex:  Nustep level 3 5 min.     Stair stretches - hip flex, hamstring, gastroc 3 x 20 sec gulshan     SKTC x 5 gulshan   DKTC x 5  LTR  Piriformis stretch figure 4 with ball   LTR stretch supine 4 x 15 sec gulshan       Reverse clam x 15  S/l clamshell GTB x20  SLR mini range x 10 gulshan   Bridges x 20 with ball   SL bridge x10  PT with alt marches x 15 gulshan red TB     Air ex x 20:  CR  Hip abduction  Hip extension  Alt UE LE march   Hip hike     NBOS activity 60 sec x 3 gulshan UE movement air ex   Modified SLS 30 sec x 4 gulshan     TB red H M row x 20  TB extension M x 20 red   TB 1 step lateral walks red x 10 gulshan     - Modalities:    Declined     ASSESSMENT:  Pt clementina well with cues for technique. Pt challenged with current DLS progression. Pt with improved clementina of dynamic activity for core and hip strength. Pt did report arm soreness with use of TB. Pt cued for core strength activation with LE and UE movement. Pt responded well.       PLAN:   To look to community resources for continued strength program. If needed will return, if does not come in 30 days then discharge from PT services.     Billing:     PT Therapeutic Procedures Time Entry  Therapeutic Exercise Time Entry: 45

## 2025-03-07 ENCOUNTER — APPOINTMENT (OUTPATIENT)
Dept: OBSTETRICS AND GYNECOLOGY | Facility: CLINIC | Age: 65
End: 2025-03-07
Payer: COMMERCIAL

## 2025-03-07 VITALS
SYSTOLIC BLOOD PRESSURE: 112 MMHG | DIASTOLIC BLOOD PRESSURE: 70 MMHG | HEIGHT: 62 IN | WEIGHT: 165 LBS | BODY MASS INDEX: 30.36 KG/M2

## 2025-03-07 DIAGNOSIS — Z01.419 WELL WOMAN EXAM: ICD-10-CM

## 2025-03-07 DIAGNOSIS — Z12.31 ENCOUNTER FOR SCREENING MAMMOGRAM FOR BREAST CANCER: ICD-10-CM

## 2025-03-07 PROCEDURE — 1036F TOBACCO NON-USER: CPT | Performed by: OBSTETRICS & GYNECOLOGY

## 2025-03-07 PROCEDURE — 3008F BODY MASS INDEX DOCD: CPT | Performed by: OBSTETRICS & GYNECOLOGY

## 2025-03-07 PROCEDURE — 99396 PREV VISIT EST AGE 40-64: CPT | Performed by: OBSTETRICS & GYNECOLOGY

## 2025-03-07 ASSESSMENT — PAIN SCALES - GENERAL: PAINLEVEL_OUTOF10: 0-NO PAIN

## 2025-03-07 NOTE — PROGRESS NOTES
Subjective   Patient ID: Whitley Zamora is a 64 y.o. female who presents for Well Women Visit (PT is here for Annual Pap and Mammogram order.).  HPI  As contained in the chief complaint  Review of Systems  10 systems have been reviewed and are negative and noncontributory to the patient current ailments.    Objective   Physical Exam  Vital signs reviewed    Constitutional: Well nourished, well developed, looks like stated age.  She is sitting comfortably on the exam table in no apparent distress  ENMT: Mucous membranes moist, no apparent lesions   Skin: Warm and dry, no lesions, no rashes  Eyes:  Normal external exam  Head/Neck: Neck supple, no bruits, thyroid symmetrical ,normal size and normal consistency  Cardiovascular: RRR without murmur, S3 or S4, 2+ equal pulses of the extremities.  Respiratory/Thorax:  breathing comfortably, no dyspnea, good chest expansion   Extremities: No deformities.  Edema, discoloration, or pain in BLE, no joint swelling, normal strength  Neurological:  A/Ox3, conversational   Psychiatric:  Alert, pleasant and cordial, age-appropriate, not anxious, or depressed appearing  Breasts: Normal appearance, no skin changes or nipple discharge.  Palpation of the breast and axillae: No no masses palpable, no organomegaly, and no axillary lymphadenopathy  Gastrointestinal: Soft, non distended, bowel sound normal pitch and intensity,no palpable abnormal masses  Genitourinary:  External genitalia: Normal.                                 - Uterus: AV/AF NSSC, mobile and nontender.  Mild uterine prolapse                                -The adnexal areas are free of tenderness or mass                                -There are no cervical lesions; there is no cervical motion tenderness                                -Vagina without lesions, mucosa pink and well-hydrated, discharge is physiologic.                                   -Inspection of Perianal Area: Normal    Assessment/Plan   Diagnoses and  all orders for this visit:  Well woman exam  -     THINPREP PAP TEST  Encounter for screening mammogram for breast cancer  -     BI mammo bilateral screening tomosynthesis; Future           Gustabo Charles DO 03/07/25 8:59 AM

## 2025-03-14 ENCOUNTER — HOSPITAL ENCOUNTER (OUTPATIENT)
Dept: RADIOLOGY | Facility: CLINIC | Age: 65
Discharge: HOME | End: 2025-03-14
Payer: COMMERCIAL

## 2025-03-14 DIAGNOSIS — Z12.31 ENCOUNTER FOR SCREENING MAMMOGRAM FOR BREAST CANCER: ICD-10-CM

## 2025-03-14 PROCEDURE — 77063 BREAST TOMOSYNTHESIS BI: CPT

## 2025-05-05 DIAGNOSIS — E03.9 HYPOTHYROIDISM, UNSPECIFIED TYPE: ICD-10-CM

## 2025-05-06 RX ORDER — LEVOTHYROXINE SODIUM 112 UG/1
112 TABLET ORAL
Qty: 30 TABLET | Refills: 0 | Status: SHIPPED | OUTPATIENT
Start: 2025-05-06

## 2025-05-06 NOTE — TELEPHONE ENCOUNTER
Follow-up visit: 5/29/25  Last visit: 7/10/24 for acute back pain  Last CPE: 3/25/24 rtc 6m  Last labs: 5/8/24 (TSH 0.60) Due    Refill request from [facility] for the following:   - levothyroxine 112mcg daily  Approved: 30 day until follow-up

## 2025-05-07 DIAGNOSIS — I10 ESSENTIAL HYPERTENSION: Primary | ICD-10-CM

## 2025-05-07 DIAGNOSIS — E78.5 HYPERLIPIDEMIA, UNSPECIFIED HYPERLIPIDEMIA TYPE: ICD-10-CM

## 2025-05-07 DIAGNOSIS — E03.9 HYPOTHYROIDISM, UNSPECIFIED TYPE: ICD-10-CM

## 2025-05-07 DIAGNOSIS — Z00.00 PHYSICAL EXAM: ICD-10-CM

## 2025-05-07 RX ORDER — LEVOTHYROXINE SODIUM 112 UG/1
112 TABLET ORAL
Qty: 30 TABLET | Refills: 0 | OUTPATIENT
Start: 2025-05-07

## 2025-05-07 NOTE — TELEPHONE ENCOUNTER
You sent in a refill for her levothyroxine for 30 days to the mail order and they will only fill it for a 90 day.   Can you resend for 30 days to her local [facility] on file?     Pended med

## 2025-05-10 LAB
ALBUMIN SERPL-MCNC: 3.9 G/DL (ref 3.6–5.1)
ALP SERPL-CCNC: 39 U/L (ref 37–153)
ALT SERPL-CCNC: 11 U/L (ref 6–29)
ANION GAP SERPL CALCULATED.4IONS-SCNC: 9 MMOL/L (CALC) (ref 7–17)
AST SERPL-CCNC: 18 U/L (ref 10–35)
BASOPHILS # BLD AUTO: 51 CELLS/UL (ref 0–200)
BASOPHILS NFR BLD AUTO: 1.1 %
BILIRUB SERPL-MCNC: 0.3 MG/DL (ref 0.2–1.2)
BUN SERPL-MCNC: 11 MG/DL (ref 7–25)
CALCIUM SERPL-MCNC: 8.9 MG/DL (ref 8.6–10.4)
CHLORIDE SERPL-SCNC: 105 MMOL/L (ref 98–110)
CHOLEST SERPL-MCNC: 181 MG/DL
CHOLEST/HDLC SERPL: 2.6 (CALC)
CO2 SERPL-SCNC: 26 MMOL/L (ref 20–32)
CREAT SERPL-MCNC: 0.64 MG/DL (ref 0.5–1.05)
EGFRCR SERPLBLD CKD-EPI 2021: 99 ML/MIN/1.73M2
EOSINOPHIL # BLD AUTO: 207 CELLS/UL (ref 15–500)
EOSINOPHIL NFR BLD AUTO: 4.5 %
ERYTHROCYTE [DISTWIDTH] IN BLOOD BY AUTOMATED COUNT: 14 % (ref 11–15)
GLUCOSE SERPL-MCNC: 86 MG/DL (ref 65–99)
HCT VFR BLD AUTO: 39.9 % (ref 35–45)
HDLC SERPL-MCNC: 70 MG/DL
HGB BLD-MCNC: 12.7 G/DL (ref 11.7–15.5)
LDLC SERPL CALC-MCNC: 96 MG/DL (CALC)
LYMPHOCYTES # BLD AUTO: 1638 CELLS/UL (ref 850–3900)
LYMPHOCYTES NFR BLD AUTO: 35.6 %
MCH RBC QN AUTO: 28.3 PG (ref 27–33)
MCHC RBC AUTO-ENTMCNC: 31.8 G/DL (ref 32–36)
MCV RBC AUTO: 89.1 FL (ref 80–100)
MONOCYTES # BLD AUTO: 290 CELLS/UL (ref 200–950)
MONOCYTES NFR BLD AUTO: 6.3 %
NEUTROPHILS # BLD AUTO: 2415 CELLS/UL (ref 1500–7800)
NEUTROPHILS NFR BLD AUTO: 52.5 %
NONHDLC SERPL-MCNC: 111 MG/DL (CALC)
PLATELET # BLD AUTO: 261 THOUSAND/UL (ref 140–400)
PMV BLD REES-ECKER: 10.6 FL (ref 7.5–12.5)
POTASSIUM SERPL-SCNC: 4.1 MMOL/L (ref 3.5–5.3)
PROT SERPL-MCNC: 6.4 G/DL (ref 6.1–8.1)
RBC # BLD AUTO: 4.48 MILLION/UL (ref 3.8–5.1)
SODIUM SERPL-SCNC: 140 MMOL/L (ref 135–146)
TRIGL SERPL-MCNC: 67 MG/DL
TSH SERPL-ACNC: 0.99 MIU/L (ref 0.4–4.5)
WBC # BLD AUTO: 4.6 THOUSAND/UL (ref 3.8–10.8)

## 2025-05-16 RX ORDER — LEVOTHYROXINE SODIUM 112 UG/1
112 TABLET ORAL
Qty: 30 TABLET | Refills: 0 | Status: SHIPPED | OUTPATIENT
Start: 2025-05-16

## 2025-05-16 NOTE — TELEPHONE ENCOUNTER
Can you please send a new prescription for Levothyroxine for 30 days to her local pharmacy [facility]?   next ov : 05-  PEND MED

## 2025-05-27 ASSESSMENT — PROMIS GLOBAL HEALTH SCALE
RATE_MENTAL_HEALTH: VERY GOOD
EMOTIONAL_PROBLEMS: NEVER
CARRYOUT_SOCIAL_ACTIVITIES: EXCELLENT
RATE_GENERAL_HEALTH: VERY GOOD
RATE_PHYSICAL_HEALTH: GOOD
RATE_QUALITY_OF_LIFE: VERY GOOD
RATE_SOCIAL_SATISFACTION: VERY GOOD
RATE_AVERAGE_PAIN: 0
CARRYOUT_PHYSICAL_ACTIVITIES: COMPLETELY

## 2025-05-29 ENCOUNTER — APPOINTMENT (OUTPATIENT)
Dept: PRIMARY CARE | Facility: CLINIC | Age: 65
End: 2025-05-29
Payer: COMMERCIAL

## 2025-05-29 VITALS
WEIGHT: 164 LBS | DIASTOLIC BLOOD PRESSURE: 91 MMHG | HEIGHT: 62 IN | HEART RATE: 86 BPM | TEMPERATURE: 98 F | SYSTOLIC BLOOD PRESSURE: 153 MMHG | BODY MASS INDEX: 30.18 KG/M2 | OXYGEN SATURATION: 98 %

## 2025-05-29 DIAGNOSIS — M85.80 OSTEOPENIA, UNSPECIFIED LOCATION: ICD-10-CM

## 2025-05-29 DIAGNOSIS — Z23 NEED FOR PNEUMOCOCCAL VACCINATION: ICD-10-CM

## 2025-05-29 DIAGNOSIS — E03.9 HYPOTHYROIDISM, UNSPECIFIED TYPE: ICD-10-CM

## 2025-05-29 DIAGNOSIS — I10 PRIMARY HYPERTENSION: ICD-10-CM

## 2025-05-29 DIAGNOSIS — Z00.00 PHYSICAL EXAM: Primary | ICD-10-CM

## 2025-05-29 PROCEDURE — 90677 PCV20 VACCINE IM: CPT | Performed by: FAMILY MEDICINE

## 2025-05-29 PROCEDURE — 3008F BODY MASS INDEX DOCD: CPT | Performed by: FAMILY MEDICINE

## 2025-05-29 PROCEDURE — 90471 IMMUNIZATION ADMIN: CPT | Performed by: FAMILY MEDICINE

## 2025-05-29 PROCEDURE — 3080F DIAST BP >= 90 MM HG: CPT | Performed by: FAMILY MEDICINE

## 2025-05-29 PROCEDURE — 3077F SYST BP >= 140 MM HG: CPT | Performed by: FAMILY MEDICINE

## 2025-05-29 PROCEDURE — 1036F TOBACCO NON-USER: CPT | Performed by: FAMILY MEDICINE

## 2025-05-29 PROCEDURE — 99396 PREV VISIT EST AGE 40-64: CPT | Performed by: FAMILY MEDICINE

## 2025-05-29 PROCEDURE — 99213 OFFICE O/P EST LOW 20 MIN: CPT | Performed by: FAMILY MEDICINE

## 2025-05-29 RX ORDER — LEVOTHYROXINE SODIUM 112 UG/1
112 TABLET ORAL
Qty: 90 TABLET | Refills: 3 | Status: SHIPPED | OUTPATIENT
Start: 2025-05-29

## 2025-05-29 ASSESSMENT — ANXIETY QUESTIONNAIRES
6. BECOMING EASILY ANNOYED OR IRRITABLE: NOT AT ALL
7. FEELING AFRAID AS IF SOMETHING AWFUL MIGHT HAPPEN: NOT AT ALL
4. TROUBLE RELAXING: NOT AT ALL
1. FEELING NERVOUS, ANXIOUS, OR ON EDGE: NOT AT ALL
2. NOT BEING ABLE TO STOP OR CONTROL WORRYING: NOT AT ALL
IF YOU CHECKED OFF ANY PROBLEMS ON THIS QUESTIONNAIRE, HOW DIFFICULT HAVE THESE PROBLEMS MADE IT FOR YOU TO DO YOUR WORK, TAKE CARE OF THINGS AT HOME, OR GET ALONG WITH OTHER PEOPLE: NOT DIFFICULT AT ALL
3. WORRYING TOO MUCH ABOUT DIFFERENT THINGS: NOT AT ALL
GAD7 TOTAL SCORE: 0
5. BEING SO RESTLESS THAT IT IS HARD TO SIT STILL: NOT AT ALL

## 2025-05-29 ASSESSMENT — PAIN SCALES - GENERAL: PAINLEVEL_OUTOF10: 0-NO PAIN

## 2025-05-29 ASSESSMENT — PATIENT HEALTH QUESTIONNAIRE - PHQ9
2. FEELING DOWN, DEPRESSED OR HOPELESS: NOT AT ALL
1. LITTLE INTEREST OR PLEASURE IN DOING THINGS: NOT AT ALL
SUM OF ALL RESPONSES TO PHQ9 QUESTIONS 1 AND 2: 0

## 2025-05-29 NOTE — PROGRESS NOTES
"Subjective   Patient ID: Whitley Zamora is a 64 y.o. female who presents for Annual Exam.    HPI   Patient is in the office today for her Annual Exam.  She would like to discuss her lab results from 5/9/2025.     Her BP is slightly elevated today when checked at the office (153/91), 144/90. She reports that at her home her BP is high sometimes with values at 130-85. She denies chest pain or SOB. She has FH of htn in both mother and father.  She does salt her food regularly, she walks for exercise    She has personal history of hypothyroidism which is treated with levothyroxine 112 mcg. She denies any side effects to the medication. Her last TSH from 5/9/2025 was at 0.99.     Her last PAP smear was in March 2025.   Her last colonoscopy was in June 2024 with 5 years recall.   Her last mammogram was in March 2025.   Her last DEXA bone density scan was in April 2024. She has hx of Osteopenia    She is overdue for her shingles vaccine, and is ok to have it at her pharmacy.   She is overdue for her pneumococcal vaccine, she is ok to have it today at the office.     Review of Systems    Objective   BP (!) 153/91 (BP Location: Left arm, Patient Position: Sitting, BP Cuff Size: Adult)   Pulse 86   Temp 36.7 °C (98 °F) (Temporal)   Ht 1.562 m (5' 1.5\")   Wt 74.4 kg (164 lb)   SpO2 98%   BMI 30.49 kg/m²     Physical Exam  Constitutional:       Appearance: Normal appearance.   HENT:      Head: Normocephalic.      Right Ear: Tympanic membrane normal.      Left Ear: Tympanic membrane normal.      Mouth/Throat:      Pharynx: Oropharynx is clear.   Neck:      Vascular: No carotid bruit.   Cardiovascular:      Rate and Rhythm: Normal rate and regular rhythm.      Pulses: Normal pulses.      Heart sounds: Normal heart sounds.   Pulmonary:      Effort: Pulmonary effort is normal.      Breath sounds: Normal breath sounds.   Abdominal:      General: Bowel sounds are normal.      Palpations: Abdomen is soft. There is no mass.     "  Tenderness: There is no abdominal tenderness.   Musculoskeletal:         General: Normal range of motion.      Cervical back: Normal range of motion.      Right lower leg: No edema.      Left lower leg: No edema.   Lymphadenopathy:      Cervical: No cervical adenopathy.   Skin:     General: Skin is warm and dry.   Neurological:      General: No focal deficit present.      Mental Status: She is alert and oriented to person, place, and time.   Psychiatric:         Mood and Affect: Mood normal.         Thought Content: Thought content normal.         Judgment: Judgment normal.         Assessment/Plan   Diagnoses and all orders for this visit:  Physical exam  Hypothyroidism, unspecified type  -     levothyroxine (Synthroid, Levoxyl) 112 mcg tablet; Take 1 tablet (112 mcg) by mouth once daily in the morning. Take before meals.  Osteopenia, unspecified location  Primary hypertension  Need for pneumococcal vaccination  -     Pneumococcal conjugate vaccine, 20-valent (PREVNAR 20)    Discussed lab results from 5/9/2025 which showed: TSH 0.99, , HDL 70, Triglycerides 67, LDL 96, glucose 86, creatinine 0.64, GFR 99.  Recommended monitor BP at home and follow-up in 4-6 weeks.  She will bring in her readings and her home bp cuff.  She will call sooner if bp very high, discussed parameters. Discussed increasing aerobic exercise and decreasing salt in diet  Recommended Prevnar 20 vaccine today at the office.  Medications refilled today. Instructed take medications as prescribed.   Follow-up in 6 weeks, call us if needed sooner.    Scribe Attestation  By signing my name below, IZeke Scribe   attest that this documentation has been prepared under the direction and in the presence of Whitley Person DO.

## 2025-07-03 ENCOUNTER — APPOINTMENT (OUTPATIENT)
Dept: PRIMARY CARE | Facility: CLINIC | Age: 65
End: 2025-07-03
Payer: COMMERCIAL

## 2025-07-03 VITALS
WEIGHT: 162 LBS | OXYGEN SATURATION: 98 % | TEMPERATURE: 98 F | DIASTOLIC BLOOD PRESSURE: 88 MMHG | HEART RATE: 76 BPM | SYSTOLIC BLOOD PRESSURE: 136 MMHG | BODY MASS INDEX: 30.11 KG/M2

## 2025-07-03 DIAGNOSIS — I10 PRIMARY HYPERTENSION: Primary | ICD-10-CM

## 2025-07-03 DIAGNOSIS — E03.9 HYPOTHYROIDISM, UNSPECIFIED TYPE: ICD-10-CM

## 2025-07-03 DIAGNOSIS — E78.5 MILD HYPERLIPIDEMIA: ICD-10-CM

## 2025-07-03 PROCEDURE — 1036F TOBACCO NON-USER: CPT | Performed by: FAMILY MEDICINE

## 2025-07-03 PROCEDURE — 99213 OFFICE O/P EST LOW 20 MIN: CPT | Performed by: FAMILY MEDICINE

## 2025-07-03 PROCEDURE — 3079F DIAST BP 80-89 MM HG: CPT | Performed by: FAMILY MEDICINE

## 2025-07-03 PROCEDURE — 3075F SYST BP GE 130 - 139MM HG: CPT | Performed by: FAMILY MEDICINE

## 2025-07-03 NOTE — PROGRESS NOTES
Subjective   Patient ID: Whitley Zamora is a 64 y.o. female who presents for Follow-up (BP FOLLOW UP ).    HPI   Patient is in the office today for a follow-up. Last OV pt had high bp.  She has decreased salt in diet and increased activity.  She is monitoring bp at home, has 2 different cuffs and cuffs appear to be accurate here today in comparison w MA checking bp as well    Her BP was slightly elevated today when checked at the office (136/88). She monitors her BP at home and is usually well controlled with systolic values under 127 and diastolic mostly in 70's to  80. She is trying to decrease the intake of Na on her diet. She denies chest pain or SOB.     She has personal history of hypothyroidism which is treated with levothyroxine 112 mcg. She denies any side effects to the medication.    Her last PAP smear was in March 2025.   Her last colonoscopy was in June 2024 with 5 years recall.   Her last mammogram was in March 2025.   Her last DEXA bone density scan was in April 2024. She has hx of Osteopenia    Review of Systems    Objective   /88 (BP Location: Left arm, Patient Position: Sitting, BP Cuff Size: Adult)   Pulse 76   Temp 36.7 °C (98 °F) (Temporal)   Wt 73.5 kg (162 lb)   SpO2 98%   BMI 30.11 kg/m²     Physical Exam  Constitutional:       Appearance: Normal appearance.   Neck:      Vascular: No carotid bruit.   Cardiovascular:      Rate and Rhythm: Normal rate and regular rhythm.      Pulses: Normal pulses.      Heart sounds: Normal heart sounds.   Pulmonary:      Effort: Pulmonary effort is normal.      Breath sounds: Normal breath sounds.   Musculoskeletal:         General: Normal range of motion.      Cervical back: Normal range of motion.      Right lower leg: No edema.      Left lower leg: No edema.   Skin:     General: Skin is warm and dry.   Neurological:      Mental Status: She is alert and oriented to person, place, and time.   Psychiatric:         Mood and Affect: Mood normal.          Behavior: Behavior normal.         Thought Content: Thought content normal.         Judgment: Judgment normal.         Assessment/Plan   Diagnoses and all orders for this visit:  Primary hypertension  Hypothyroidism, unspecified type  Mild hyperlipidemia      Discussed about blood pressure cuffs today at the office. Checked bp w both cuffs and instructed pt correct readings etc.    Recommended continue monitor her BP at home and call office if persistantly elevated again and would start med if needed.  Discussed diet and exercise to control, will check bp 2 x per week and discussed parameters           Scribe Attestation  By signing my name below, IZeke Scribe   attest that this documentation has been prepared under the direction and in the presence of Whitley Person DO.

## 2026-06-05 ENCOUNTER — APPOINTMENT (OUTPATIENT)
Dept: PRIMARY CARE | Facility: CLINIC | Age: 66
End: 2026-06-05
Payer: COMMERCIAL